# Patient Record
Sex: FEMALE | Race: AMERICAN INDIAN OR ALASKA NATIVE
[De-identification: names, ages, dates, MRNs, and addresses within clinical notes are randomized per-mention and may not be internally consistent; named-entity substitution may affect disease eponyms.]

---

## 2018-02-20 ENCOUNTER — HOSPITAL ENCOUNTER (OUTPATIENT)
Dept: HOSPITAL 5 - SPVIMAG | Age: 48
Discharge: HOME | End: 2018-02-20
Attending: NURSE PRACTITIONER
Payer: COMMERCIAL

## 2018-02-20 DIAGNOSIS — R76.11: Primary | ICD-10-CM

## 2018-02-20 DIAGNOSIS — M41.84: ICD-10-CM

## 2018-02-20 PROCEDURE — 71046 X-RAY EXAM CHEST 2 VIEWS: CPT

## 2018-02-20 NOTE — XRAY REPORT
XRAY CHEST TWO VIEWS: 02/20/18 13:54:00



CLINICAL: History of a +PPD



COMPARISON: None.



FINDINGS: Normal heart and pulmonary vasculature.  The lungs are 

normally expanded and clear.Mild levoscoliosis.



IMPRESSION: Negative study with no signs of acute or chronic TB.